# Patient Record
Sex: MALE | Race: OTHER | Employment: UNEMPLOYED | URBAN - METROPOLITAN AREA
[De-identification: names, ages, dates, MRNs, and addresses within clinical notes are randomized per-mention and may not be internally consistent; named-entity substitution may affect disease eponyms.]

---

## 2019-09-13 ENCOUNTER — NEW PATIENT (OUTPATIENT)
Dept: URBAN - METROPOLITAN AREA CLINIC 46 | Facility: CLINIC | Age: 54
End: 2019-09-13

## 2019-09-13 DIAGNOSIS — H40.1133: ICD-10-CM

## 2019-09-13 DIAGNOSIS — H25.813: ICD-10-CM

## 2019-09-13 DIAGNOSIS — H35.373: ICD-10-CM

## 2019-09-13 DIAGNOSIS — H04.123: ICD-10-CM

## 2019-09-13 PROCEDURE — 99204 OFFICE O/P NEW MOD 45 MIN: CPT

## 2019-09-13 PROCEDURE — 92020 GONIOSCOPY: CPT

## 2019-09-13 PROCEDURE — 76514 ECHO EXAM OF EYE THICKNESS: CPT

## 2019-09-13 PROCEDURE — 92250 FUNDUS PHOTOGRAPHY W/I&R: CPT

## 2019-09-13 PROCEDURE — 9222550 BILAT EXTENDED OPHTHALMOSCOPY, FIRST

## 2019-09-13 RX ORDER — NETARSUDIL AND LATANOPROST OPHTHALMIC SOLUTION, 0.02%/0.005% .2; .05 MG/ML; MG/ML
1 SOLUTION/ DROPS OPHTHALMIC; TOPICAL EVERY EVENING
Start: 2019-09-13

## 2019-09-13 ASSESSMENT — VISUAL ACUITY
OS_SC: 20/25-2
OD_SC: <J12
OD_SC: 20/25-2
OD_CC: J2
OS_SC: <J12
OS_CC: J3

## 2019-09-13 ASSESSMENT — PACHYMETRY
OD_CT_UM: 558
OS_CT_UM: 555

## 2019-09-13 ASSESSMENT — TONOMETRY
OD_IOP_MMHG: 24
OS_IOP_MMHG: 20

## 2019-10-04 ENCOUNTER — VISUAL FIELD FOLLOW-UP (OUTPATIENT)
Dept: URBAN - METROPOLITAN AREA CLINIC 46 | Facility: CLINIC | Age: 54
End: 2019-10-04

## 2019-10-04 DIAGNOSIS — H04.123: ICD-10-CM

## 2019-10-04 DIAGNOSIS — H40.1133: ICD-10-CM

## 2019-10-04 DIAGNOSIS — H25.813: ICD-10-CM

## 2019-10-04 DIAGNOSIS — H35.373: ICD-10-CM

## 2019-10-04 PROCEDURE — 92012 INTRM OPH EXAM EST PATIENT: CPT

## 2019-10-04 PROCEDURE — 92083 EXTENDED VISUAL FIELD XM: CPT

## 2019-10-04 RX ORDER — MOXIFLOXACIN HYDROCHLORIDE 5 MG/ML: 1 SOLUTION/ DROPS OPHTHALMIC

## 2019-10-04 RX ORDER — PREDNISOLONE ACETATE 10 MG/ML: 1 SUSPENSION/ DROPS OPHTHALMIC

## 2019-10-04 RX ORDER — CYCLOPENTOLATE HYDROCHLORIDE 10 MG/ML: 1 SOLUTION/ DROPS OPHTHALMIC TWICE A DAY

## 2019-10-04 ASSESSMENT — VISUAL ACUITY
OS_SC: 20/25
OD_SC: 20/30

## 2019-10-04 ASSESSMENT — TONOMETRY
OD_IOP_MMHG: 22
OS_IOP_MMHG: 20

## 2019-10-21 ENCOUNTER — PRE-OP/H&P (OUTPATIENT)
Dept: URBAN - METROPOLITAN AREA SURGERY 14 | Facility: SURGERY | Age: 54
End: 2019-10-21

## 2019-10-21 ENCOUNTER — SURGERY/PROCEDURE (OUTPATIENT)
Dept: URBAN - METROPOLITAN AREA CLINIC 46 | Facility: CLINIC | Age: 54
End: 2019-10-21

## 2019-10-21 DIAGNOSIS — H40.1113: ICD-10-CM

## 2019-10-21 DIAGNOSIS — H40.1133: ICD-10-CM

## 2019-10-21 PROCEDURE — 99211HP H&P OFFICE/OUTPATIENT VISIT, EST

## 2019-10-21 PROCEDURE — 66172 INCISION OF EYE: CPT

## 2019-10-22 ENCOUNTER — POST-OP (OUTPATIENT)
Dept: URBAN - METROPOLITAN AREA CLINIC 43 | Facility: CLINIC | Age: 54
End: 2019-10-22

## 2019-10-22 DIAGNOSIS — H40.1133: ICD-10-CM

## 2019-10-22 PROCEDURE — 66999PO NON CO-MANAGED OTHER SURGERY PO

## 2019-10-22 ASSESSMENT — VISUAL ACUITY
OD_PH: 20/70-2
OS_SC: 20/30-2
OD_SC: 20/200

## 2019-10-22 ASSESSMENT — TONOMETRY
OS_IOP_MMHG: 17
OD_IOP_MMHG: 09

## 2019-11-01 ENCOUNTER — POST-OP (OUTPATIENT)
Dept: URBAN - METROPOLITAN AREA CLINIC 36 | Facility: CLINIC | Age: 54
End: 2019-11-01

## 2019-11-01 DIAGNOSIS — H40.1133: ICD-10-CM

## 2019-11-01 PROCEDURE — 99024 POSTOP FOLLOW-UP VISIT: CPT

## 2019-11-01 ASSESSMENT — VISUAL ACUITY
OD_PH: 20/50-2
OD_SC: 20/400
OS_SC: 20/40-2

## 2019-11-01 ASSESSMENT — TONOMETRY
OD_IOP_MMHG: 16
OS_IOP_MMHG: 17

## 2019-11-08 ENCOUNTER — POST-OP (OUTPATIENT)
Dept: URBAN - METROPOLITAN AREA CLINIC 36 | Facility: CLINIC | Age: 54
End: 2019-11-08

## 2019-11-08 DIAGNOSIS — H40.1133: ICD-10-CM

## 2019-11-08 PROCEDURE — 99024 POSTOP FOLLOW-UP VISIT: CPT

## 2019-11-08 ASSESSMENT — TONOMETRY
OS_IOP_MMHG: 13
OD_IOP_MMHG: 10

## 2019-11-08 ASSESSMENT — VISUAL ACUITY
OS_SC: 20/50-1
OD_SC: 20/80-1
OD_PH: 20/40-1

## 2019-11-22 ENCOUNTER — POST-OP (OUTPATIENT)
Dept: URBAN - METROPOLITAN AREA CLINIC 36 | Facility: CLINIC | Age: 54
End: 2019-11-22

## 2019-11-22 DIAGNOSIS — H40.1133: ICD-10-CM

## 2019-11-22 PROCEDURE — 99024 POSTOP FOLLOW-UP VISIT: CPT

## 2019-11-22 ASSESSMENT — TONOMETRY
OD_IOP_MMHG: 15
OS_IOP_MMHG: 14

## 2019-11-22 ASSESSMENT — VISUAL ACUITY
OS_SC: 20/40-2
OD_PH: 20/40+1
OD_SC: 20/70-2

## 2019-12-06 ENCOUNTER — POST-OP (OUTPATIENT)
Dept: URBAN - METROPOLITAN AREA CLINIC 36 | Facility: CLINIC | Age: 54
End: 2019-12-06

## 2019-12-06 DIAGNOSIS — H40.1133: ICD-10-CM

## 2019-12-06 PROCEDURE — 66999PO NON CO-MANAGED OTHER SURGERY PO

## 2019-12-06 ASSESSMENT — VISUAL ACUITY: OD_PH: 20/40-2

## 2019-12-06 ASSESSMENT — TONOMETRY
OS_IOP_MMHG: 14
OD_IOP_MMHG: 16

## 2019-12-13 ENCOUNTER — POST-OP (OUTPATIENT)
Dept: URBAN - METROPOLITAN AREA CLINIC 36 | Facility: CLINIC | Age: 54
End: 2019-12-13

## 2019-12-13 DIAGNOSIS — H40.1133: ICD-10-CM

## 2019-12-13 PROCEDURE — 66250 FOLLOW-UP SURGERY OF EYE: CPT

## 2019-12-13 PROCEDURE — 99024 POSTOP FOLLOW-UP VISIT: CPT

## 2019-12-13 ASSESSMENT — TONOMETRY
OD_IOP_MMHG: 12
OD_IOP_MMHG: 16
OS_IOP_MMHG: 16

## 2019-12-13 ASSESSMENT — VISUAL ACUITY
OS_SC: 20/40-1
OD_PH: 20/40
OD_SC: 20/50-2

## 2019-12-20 ENCOUNTER — POST-OP (OUTPATIENT)
Dept: URBAN - METROPOLITAN AREA CLINIC 36 | Facility: CLINIC | Age: 54
End: 2019-12-20

## 2019-12-20 DIAGNOSIS — H40.1133: ICD-10-CM

## 2019-12-20 PROCEDURE — 99024 POSTOP FOLLOW-UP VISIT: CPT

## 2019-12-20 RX ORDER — DUREZOL 0.5 MG/ML: 1 EMULSION OPHTHALMIC

## 2019-12-20 ASSESSMENT — VISUAL ACUITY
OS_SC: 20/40
OD_SC: 20/70

## 2019-12-20 ASSESSMENT — TONOMETRY
OS_IOP_MMHG: 13
OD_IOP_MMHG: 16
OD_IOP_MMHG: 10

## 2020-01-10 ENCOUNTER — POST-OP (OUTPATIENT)
Dept: URBAN - METROPOLITAN AREA CLINIC 36 | Facility: CLINIC | Age: 55
End: 2020-01-10

## 2020-01-10 DIAGNOSIS — H40.1133: ICD-10-CM

## 2020-01-10 PROCEDURE — 99024 POSTOP FOLLOW-UP VISIT: CPT

## 2020-01-10 ASSESSMENT — TONOMETRY
OS_IOP_MMHG: 13
OD_IOP_MMHG: 05
OD_IOP_MMHG: 16

## 2020-01-10 ASSESSMENT — VISUAL ACUITY
OD_SC: 20/40-2
OS_SC: 20/25

## 2020-01-24 ENCOUNTER — IOP CHECK (OUTPATIENT)
Dept: URBAN - METROPOLITAN AREA CLINIC 36 | Facility: CLINIC | Age: 55
End: 2020-01-24

## 2020-01-24 DIAGNOSIS — H40.1133: ICD-10-CM

## 2020-01-24 PROCEDURE — 92012 INTRM OPH EXAM EST PATIENT: CPT

## 2020-01-24 ASSESSMENT — VISUAL ACUITY
OD_SC: 20/40-1
OS_SC: 20/25

## 2020-01-24 ASSESSMENT — TONOMETRY
OD_IOP_MMHG: 10
OS_IOP_MMHG: 11

## 2020-02-07 ENCOUNTER — IOP CHECK (OUTPATIENT)
Dept: URBAN - METROPOLITAN AREA CLINIC 36 | Facility: CLINIC | Age: 55
End: 2020-02-07

## 2020-02-07 DIAGNOSIS — H04.123: ICD-10-CM

## 2020-02-07 DIAGNOSIS — H25.813: ICD-10-CM

## 2020-02-07 DIAGNOSIS — H35.373: ICD-10-CM

## 2020-02-07 DIAGNOSIS — H40.1133: ICD-10-CM

## 2020-02-07 PROCEDURE — 92012 INTRM OPH EXAM EST PATIENT: CPT

## 2020-02-07 ASSESSMENT — TONOMETRY
OD_IOP_MMHG: 12
OS_IOP_MMHG: 17

## 2020-02-07 ASSESSMENT — VISUAL ACUITY
OD_SC: 20/40+2
OS_SC: 20/25-2

## 2020-03-20 ENCOUNTER — IOP CHECK (OUTPATIENT)
Dept: URBAN - METROPOLITAN AREA CLINIC 36 | Facility: CLINIC | Age: 55
End: 2020-03-20

## 2020-03-20 DIAGNOSIS — H40.1133: ICD-10-CM

## 2020-03-20 DIAGNOSIS — H25.813: ICD-10-CM

## 2020-03-20 DIAGNOSIS — H35.373: ICD-10-CM

## 2020-03-20 DIAGNOSIS — H04.123: ICD-10-CM

## 2020-03-20 PROCEDURE — 92012 INTRM OPH EXAM EST PATIENT: CPT

## 2020-03-20 ASSESSMENT — TONOMETRY
OD_IOP_MMHG: 13
OS_IOP_MMHG: 18

## 2020-03-20 ASSESSMENT — VISUAL ACUITY
OD_SC: 20/40
OS_SC: 20/40

## 2020-05-15 ENCOUNTER — IOP CHECK (OUTPATIENT)
Dept: URBAN - METROPOLITAN AREA CLINIC 36 | Facility: CLINIC | Age: 55
End: 2020-05-15

## 2020-05-15 DIAGNOSIS — H35.373: ICD-10-CM

## 2020-05-15 DIAGNOSIS — H25.813: ICD-10-CM

## 2020-05-15 DIAGNOSIS — H04.123: ICD-10-CM

## 2020-05-15 DIAGNOSIS — H40.1133: ICD-10-CM

## 2020-05-15 PROCEDURE — 92012 INTRM OPH EXAM EST PATIENT: CPT

## 2020-05-15 ASSESSMENT — TONOMETRY
OS_IOP_MMHG: 17
OD_IOP_MMHG: 13

## 2020-05-15 ASSESSMENT — VISUAL ACUITY
OS_SC: 20/40
OD_SC: 20/40

## 2020-06-12 ENCOUNTER — IOP CHECK (OUTPATIENT)
Dept: URBAN - METROPOLITAN AREA CLINIC 36 | Facility: CLINIC | Age: 55
End: 2020-06-12

## 2020-06-12 DIAGNOSIS — H40.1133: ICD-10-CM

## 2020-06-12 PROCEDURE — 92012 INTRM OPH EXAM EST PATIENT: CPT

## 2020-06-12 ASSESSMENT — VISUAL ACUITY
OD_SC: 20/40
OS_SC: 20/40

## 2020-06-12 ASSESSMENT — TONOMETRY
OD_IOP_MMHG: 10
OS_IOP_MMHG: 18

## 2020-07-17 ENCOUNTER — IOP CHECK (OUTPATIENT)
Dept: URBAN - METROPOLITAN AREA CLINIC 36 | Facility: CLINIC | Age: 55
End: 2020-07-17

## 2020-07-17 DIAGNOSIS — H40.1133: ICD-10-CM

## 2020-07-17 PROCEDURE — 92012 INTRM OPH EXAM EST PATIENT: CPT

## 2020-07-17 ASSESSMENT — TONOMETRY
OS_IOP_MMHG: 24
OD_IOP_MMHG: 10

## 2020-07-17 ASSESSMENT — VISUAL ACUITY
OS_SC: 20/50
OD_SC: 20/40-2

## 2020-08-14 ENCOUNTER — IOP CHECK (OUTPATIENT)
Dept: URBAN - METROPOLITAN AREA CLINIC 36 | Facility: CLINIC | Age: 55
End: 2020-08-14

## 2020-08-14 DIAGNOSIS — H40.1133: ICD-10-CM

## 2020-08-14 PROCEDURE — 92012 INTRM OPH EXAM EST PATIENT: CPT

## 2020-08-14 ASSESSMENT — TONOMETRY
OS_IOP_MMHG: 18
OD_IOP_MMHG: 08

## 2020-08-14 ASSESSMENT — VISUAL ACUITY
OD_SC: 20/40
OS_SC: 20/40

## 2020-09-11 ENCOUNTER — IOP CHECK (OUTPATIENT)
Dept: URBAN - METROPOLITAN AREA CLINIC 36 | Facility: CLINIC | Age: 55
End: 2020-09-11

## 2020-09-11 DIAGNOSIS — H40.1133: ICD-10-CM

## 2020-09-11 PROCEDURE — 92012 INTRM OPH EXAM EST PATIENT: CPT

## 2020-09-11 ASSESSMENT — VISUAL ACUITY
OS_SC: 20/40-2
OD_SC: 20/30-2

## 2020-09-11 ASSESSMENT — TONOMETRY
OS_IOP_MMHG: 14
OD_IOP_MMHG: 07

## 2021-02-12 ENCOUNTER — IOP CHECK (OUTPATIENT)
Dept: URBAN - METROPOLITAN AREA CLINIC 36 | Facility: CLINIC | Age: 56
End: 2021-02-12

## 2021-02-12 DIAGNOSIS — H40.1133: ICD-10-CM

## 2021-02-12 DIAGNOSIS — H35.373: ICD-10-CM

## 2021-02-12 DIAGNOSIS — H04.123: ICD-10-CM

## 2021-02-12 DIAGNOSIS — H25.813: ICD-10-CM

## 2021-02-12 PROCEDURE — 92012 INTRM OPH EXAM EST PATIENT: CPT

## 2021-02-12 ASSESSMENT — TONOMETRY
OD_IOP_MMHG: 12
OS_IOP_MMHG: 19

## 2021-02-12 ASSESSMENT — VISUAL ACUITY
OS_SC: 20/40
OD_SC: 20/40

## 2021-05-14 ENCOUNTER — DILATED FUNDUS EXAM (OUTPATIENT)
Dept: URBAN - METROPOLITAN AREA CLINIC 36 | Facility: CLINIC | Age: 56
End: 2021-05-14

## 2021-05-14 DIAGNOSIS — H40.1133: ICD-10-CM

## 2021-05-14 PROCEDURE — 92133 CPTRZD OPH DX IMG PST SGM ON: CPT

## 2021-05-14 PROCEDURE — 92012 INTRM OPH EXAM EST PATIENT: CPT

## 2021-05-14 PROCEDURE — 92202 OPSCPY EXTND ON/MAC DRAW: CPT

## 2021-05-14 ASSESSMENT — TONOMETRY
OS_IOP_MMHG: 16
OD_IOP_MMHG: 10

## 2021-05-14 ASSESSMENT — VISUAL ACUITY
OS_SC: 20/40+1
OD_SC: 20/40+1

## 2021-09-17 ENCOUNTER — ESTABLISHED COMPREHENSIVE EXAM (OUTPATIENT)
Dept: URBAN - METROPOLITAN AREA CLINIC 36 | Facility: CLINIC | Age: 56
End: 2021-09-17

## 2021-09-17 DIAGNOSIS — H40.1133: ICD-10-CM

## 2021-09-17 PROCEDURE — 92014 COMPRE OPH EXAM EST PT 1/>: CPT

## 2021-09-17 PROCEDURE — 92250 FUNDUS PHOTOGRAPHY W/I&R: CPT

## 2021-09-17 RX ORDER — BRIMONIDINE TARTRATE 1 MG/ML: 1 SOLUTION/ DROPS OPHTHALMIC

## 2021-09-17 ASSESSMENT — VISUAL ACUITY
OS_CC: J5
OD_SC: <J12
OD_SC: 20/50-2
OS_SC: 20/30
OD_CC: J5
OU_SC: 20/30-1
OS_SC: <J12
OU_SC: <J12
OU_CC: J5

## 2021-09-17 ASSESSMENT — TONOMETRY
OD_IOP_MMHG: 8
OS_IOP_MMHG: 8

## 2021-12-17 ENCOUNTER — ESTABLISHED PATIENT (OUTPATIENT)
Dept: URBAN - METROPOLITAN AREA CLINIC 36 | Facility: CLINIC | Age: 56
End: 2021-12-17

## 2021-12-17 DIAGNOSIS — H40.1133: ICD-10-CM

## 2021-12-17 PROCEDURE — 92083 EXTENDED VISUAL FIELD XM: CPT

## 2021-12-17 PROCEDURE — 92012 INTRM OPH EXAM EST PATIENT: CPT

## 2021-12-17 ASSESSMENT — TONOMETRY
OS_IOP_MMHG: 16
OD_IOP_MMHG: 09

## 2021-12-17 ASSESSMENT — VISUAL ACUITY
OS_SC: 20/40
OD_SC: 20/60-2

## 2022-01-05 NOTE — PATIENT DISCUSSION
Pt. elects Basic [facility]-OU.  Pt. understands and accepts the need for full time correction after surgery d/t astigmatism.  Pt. did not want to discuss any other lens option other than basic.

## 2022-02-02 NOTE — PATIENT DISCUSSION
Discussed the importance of regular follow ups because glaucoma can lead to irreversible vision loss.

## 2023-01-27 ENCOUNTER — COMPREHENSIVE EXAM (OUTPATIENT)
Dept: URBAN - METROPOLITAN AREA CLINIC 36 | Facility: CLINIC | Age: 58
End: 2023-01-27

## 2023-01-27 DIAGNOSIS — H40.1133: ICD-10-CM

## 2023-01-27 PROCEDURE — 92014 COMPRE OPH EXAM EST PT 1/>: CPT

## 2023-01-27 PROCEDURE — 92250 FUNDUS PHOTOGRAPHY W/I&R: CPT

## 2023-01-27 ASSESSMENT — VISUAL ACUITY
OD_PH: 20/50
OD_RAM: 20/30
OS_SC: 20/50
OD_BAT: 20/200
OD_SC: 20/70
OS_SC: J12
OS_BAT: 20/80
OS_AM: 20/30
OD_SC: J12

## 2023-01-27 ASSESSMENT — TONOMETRY
OS_IOP_MMHG: 11
OD_IOP_MMHG: 07

## 2023-05-19 ENCOUNTER — CONSULTATION/EVALUATION (OUTPATIENT)
Dept: URBAN - METROPOLITAN AREA CLINIC 36 | Facility: CLINIC | Age: 58
End: 2023-05-19

## 2023-05-19 DIAGNOSIS — H40.1133: ICD-10-CM

## 2023-05-19 DIAGNOSIS — H18.513: ICD-10-CM

## 2023-05-19 DIAGNOSIS — H04.123: ICD-10-CM

## 2023-05-19 DIAGNOSIS — H25.813: ICD-10-CM

## 2023-05-19 PROCEDURE — 92286 ANT SGM IMG I&R SPECLR MIC: CPT

## 2023-05-19 PROCEDURE — 92025-1 CORNEAL TOPOGRAPHY, INS

## 2023-05-19 PROCEDURE — 92136TC INTERFEROMETRY - TECHNICAL COMPONENT

## 2023-05-19 PROCEDURE — 92133 CPTRZD OPH DX IMG PST SGM ON: CPT

## 2023-05-19 PROCEDURE — V2799PMN IMPRIMIS PRED-MOXI-NEPAF 5ML

## 2023-05-19 PROCEDURE — 92004 COMPRE OPH EXAM NEW PT 1/>: CPT

## 2023-05-19 ASSESSMENT — VISUAL ACUITY
OD_CC: J6
OS_AM: 20/30
OS_BAT: 20/80
OD_BAT: 20/200
OD_RAM: 20/30
OD_SC: 20/60-2
OS_SC: 20/40-2

## 2023-05-19 ASSESSMENT — TONOMETRY
OS_IOP_MMHG: 10
OD_IOP_MMHG: 09

## 2023-06-06 ENCOUNTER — SURGERY/PROCEDURE (OUTPATIENT)
Dept: URBAN - METROPOLITAN AREA CLINIC 36 | Facility: CLINIC | Age: 58
End: 2023-06-06

## 2023-06-06 DIAGNOSIS — H40.1123: ICD-10-CM

## 2023-06-06 DIAGNOSIS — H25.813: ICD-10-CM

## 2023-06-06 PROCEDURE — 66984 XCAPSL CTRC RMVL W/O ECP: CPT

## 2023-06-06 PROCEDURE — 65820 GONIOTOMY: CPT

## 2023-06-07 ENCOUNTER — POST-OP (OUTPATIENT)
Dept: URBAN - METROPOLITAN AREA CLINIC 36 | Facility: CLINIC | Age: 58
End: 2023-06-07

## 2023-06-07 DIAGNOSIS — Z96.1: ICD-10-CM

## 2023-06-07 PROCEDURE — 99024 POSTOP FOLLOW-UP VISIT: CPT

## 2023-06-07 ASSESSMENT — VISUAL ACUITY
OS_SC: 20/50+1
OD_SC: 20/80-1

## 2023-06-07 ASSESSMENT — TONOMETRY
OS_IOP_MMHG: 13
OD_IOP_MMHG: 12

## 2023-06-13 ENCOUNTER — POST OP/EVAL OF SECOND EYE (OUTPATIENT)
Dept: URBAN - METROPOLITAN AREA CLINIC 36 | Facility: CLINIC | Age: 58
End: 2023-06-13

## 2023-06-13 DIAGNOSIS — H25.811: ICD-10-CM

## 2023-06-13 DIAGNOSIS — H43.821: ICD-10-CM

## 2023-06-13 DIAGNOSIS — H35.353: ICD-10-CM

## 2023-06-13 DIAGNOSIS — H40.1133: ICD-10-CM

## 2023-06-13 DIAGNOSIS — H35.371: ICD-10-CM

## 2023-06-13 DIAGNOSIS — Z96.1: ICD-10-CM

## 2023-06-13 DIAGNOSIS — H18.513: ICD-10-CM

## 2023-06-13 DIAGNOSIS — H04.123: ICD-10-CM

## 2023-06-13 PROCEDURE — 92012 INTRM OPH EXAM EST PATIENT: CPT

## 2023-06-13 ASSESSMENT — VISUAL ACUITY
OD_SC: 20/50-2
OS_SC: J8
OS_SC: 20/50+1
OD_SC: J6

## 2023-06-13 ASSESSMENT — TONOMETRY
OS_IOP_MMHG: 08
OD_IOP_MMHG: 07

## 2023-06-20 ENCOUNTER — PRE-OP/H&P (OUTPATIENT)
Dept: URBAN - METROPOLITAN AREA CLINIC 39 | Facility: CLINIC | Age: 58
End: 2023-06-20

## 2023-06-20 ENCOUNTER — SURGERY/PROCEDURE (OUTPATIENT)
Dept: URBAN - METROPOLITAN AREA CLINIC 36 | Facility: CLINIC | Age: 58
End: 2023-06-20

## 2023-06-20 DIAGNOSIS — H25.811: ICD-10-CM

## 2023-06-20 DIAGNOSIS — H35.353: ICD-10-CM

## 2023-06-20 DIAGNOSIS — H40.1133: ICD-10-CM

## 2023-06-20 DIAGNOSIS — H53.141: ICD-10-CM

## 2023-06-20 DIAGNOSIS — H35.371: ICD-10-CM

## 2023-06-20 DIAGNOSIS — Z96.1: ICD-10-CM

## 2023-06-20 DIAGNOSIS — H43.821: ICD-10-CM

## 2023-06-20 PROCEDURE — 65820 GONIOTOMY: CPT

## 2023-06-20 PROCEDURE — 66984 XCAPSL CTRC RMVL W/O ECP: CPT

## 2023-06-21 ENCOUNTER — POST-OP (OUTPATIENT)
Dept: URBAN - METROPOLITAN AREA CLINIC 46 | Facility: CLINIC | Age: 58
End: 2023-06-21

## 2023-06-21 DIAGNOSIS — Z96.1: ICD-10-CM

## 2023-06-21 ASSESSMENT — TONOMETRY
OD_IOP_MMHG: 10
OS_IOP_MMHG: 08

## 2023-06-21 ASSESSMENT — VISUAL ACUITY
OS_SC: 20/40
OD_SC: 20/60
OS_PH: 20/30
OD_PH: 20/50

## 2023-07-12 ENCOUNTER — POST-OP (OUTPATIENT)
Dept: URBAN - METROPOLITAN AREA CLINIC 36 | Facility: CLINIC | Age: 58
End: 2023-07-12

## 2023-07-12 DIAGNOSIS — Z96.1: ICD-10-CM

## 2023-07-12 PROCEDURE — 99024 POSTOP FOLLOW-UP VISIT: CPT

## 2023-07-12 ASSESSMENT — TONOMETRY
OD_IOP_MMHG: 08
OS_IOP_MMHG: 08
OS_IOP_MMHG: 12
OD_IOP_MMHG: 12

## 2023-07-12 ASSESSMENT — VISUAL ACUITY
OS_SC: J>10
OD_SC: 20/50-1
OS_SC: 20/50-2
OD_SC: J>10

## 2023-08-23 ENCOUNTER — COMPREHENSIVE EXAM (OUTPATIENT)
Dept: URBAN - METROPOLITAN AREA CLINIC 36 | Facility: CLINIC | Age: 58
End: 2023-08-23

## 2023-08-23 DIAGNOSIS — H04.123: ICD-10-CM

## 2023-08-23 DIAGNOSIS — H43.821: ICD-10-CM

## 2023-08-23 DIAGNOSIS — H40.1133: ICD-10-CM

## 2023-08-23 DIAGNOSIS — H35.371: ICD-10-CM

## 2023-08-23 DIAGNOSIS — H35.353: ICD-10-CM

## 2023-08-23 DIAGNOSIS — Z96.1: ICD-10-CM

## 2023-08-23 PROCEDURE — 67515 INJECT/TREAT EYE SOCKET: CPT

## 2023-08-23 PROCEDURE — 92235 FLUORESCEIN ANGRPH MLTIFRAME: CPT

## 2023-08-23 PROCEDURE — 99214 OFFICE O/P EST MOD 30 MIN: CPT

## 2023-08-23 PROCEDURE — 92250 FUNDUS PHOTOGRAPHY W/I&R: CPT

## 2023-08-23 RX ORDER — KETOROLAC TROMETHAMINE 5 MG/ML
1 SOLUTION OPHTHALMIC
Start: 2023-08-23

## 2023-08-23 RX ORDER — DIFLUPREDNATE 0.5 MG/ML
1 EMULSION OPHTHALMIC
Start: 2023-08-23

## 2023-08-23 ASSESSMENT — VISUAL ACUITY
OS_SC: 20/40-1
OD_SC: 20/40-1+2

## 2023-08-23 ASSESSMENT — TONOMETRY
OD_IOP_MMHG: 05
OS_IOP_MMHG: 06

## 2023-10-04 ENCOUNTER — ESTABLISHED PATIENT (OUTPATIENT)
Dept: URBAN - METROPOLITAN AREA CLINIC 36 | Facility: CLINIC | Age: 58
End: 2023-10-04

## 2023-10-04 DIAGNOSIS — H40.1133: ICD-10-CM

## 2023-10-04 DIAGNOSIS — H43.821: ICD-10-CM

## 2023-10-04 DIAGNOSIS — H04.123: ICD-10-CM

## 2023-10-04 DIAGNOSIS — H35.353: ICD-10-CM

## 2023-10-04 DIAGNOSIS — H35.371: ICD-10-CM

## 2023-10-04 PROCEDURE — 99213 OFFICE O/P EST LOW 20 MIN: CPT

## 2023-10-04 PROCEDURE — 92250 FUNDUS PHOTOGRAPHY W/I&R: CPT

## 2023-10-04 ASSESSMENT — VISUAL ACUITY
OD_SC: 20/40-1
OS_SC: 20/50-1

## 2023-10-04 ASSESSMENT — TONOMETRY
OS_IOP_MMHG: 11
OD_IOP_MMHG: 10

## 2023-12-07 ENCOUNTER — FOLLOW UP (OUTPATIENT)
Dept: URBAN - METROPOLITAN AREA CLINIC 46 | Facility: CLINIC | Age: 58
End: 2023-12-07

## 2023-12-07 DIAGNOSIS — H40.1133: ICD-10-CM

## 2023-12-07 DIAGNOSIS — H35.371: ICD-10-CM

## 2023-12-07 DIAGNOSIS — H35.353: ICD-10-CM

## 2023-12-07 DIAGNOSIS — H26.493: ICD-10-CM

## 2023-12-07 PROCEDURE — 92012 INTRM OPH EXAM EST PATIENT: CPT

## 2023-12-07 ASSESSMENT — VISUAL ACUITY
OS_BAT: 20/200
OD_SC: 20/50-2
OS_SC: 20/40-1
OD_BAT: 20/100

## 2023-12-07 ASSESSMENT — TONOMETRY
OD_IOP_MMHG: 10
OS_IOP_MMHG: 10

## 2023-12-21 ENCOUNTER — CONSULTATION/EVALUATION (OUTPATIENT)
Dept: URBAN - METROPOLITAN AREA CLINIC 43 | Facility: CLINIC | Age: 58
End: 2023-12-21

## 2023-12-21 DIAGNOSIS — H26.493: ICD-10-CM

## 2023-12-21 DIAGNOSIS — H40.1133: ICD-10-CM

## 2023-12-21 DIAGNOSIS — H35.371: ICD-10-CM

## 2023-12-21 PROCEDURE — 92004 COMPRE OPH EXAM NEW PT 1/>: CPT

## 2023-12-21 RX ORDER — PREDNISOLONE ACETATE 10 MG/ML
1 SUSPENSION/ DROPS OPHTHALMIC ONCE A DAY
Start: 2023-12-21

## 2023-12-21 ASSESSMENT — VISUAL ACUITY
OD_SC: 20/40
OS_SC: 20/40-1
OS_SC: <J12
OD_BAT: 20/100
OS_BAT: 20/200
OD_SC: <J12

## 2023-12-21 ASSESSMENT — TONOMETRY
OS_IOP_MMHG: 10
OD_IOP_MMHG: 14

## 2024-01-16 ENCOUNTER — PRE-OP/H&P (OUTPATIENT)
Dept: URBAN - METROPOLITAN AREA SURGERY 14 | Facility: SURGERY | Age: 59
End: 2024-01-16

## 2024-01-16 ENCOUNTER — SURGERY/PROCEDURE (OUTPATIENT)
Dept: URBAN - METROPOLITAN AREA SURGERY 14 | Facility: SURGERY | Age: 59
End: 2024-01-16

## 2024-01-16 DIAGNOSIS — H26.493: ICD-10-CM

## 2024-01-16 PROCEDURE — 6682150 YAG CAPSULOTOMY

## 2024-01-16 PROCEDURE — 99211HP PRE-OP

## 2024-04-10 ENCOUNTER — POST-OP (OUTPATIENT)
Dept: URBAN - METROPOLITAN AREA CLINIC 46 | Facility: CLINIC | Age: 59
End: 2024-04-10

## 2024-04-10 DIAGNOSIS — Z98.890: ICD-10-CM

## 2024-04-10 PROCEDURE — 99024 POSTOP FOLLOW-UP VISIT: CPT

## 2024-04-10 ASSESSMENT — VISUAL ACUITY
OD_CC: J5
OS_SC: 20/50-1
OD_SC: 20/30-2
OS_CC: J8

## 2024-04-10 ASSESSMENT — TONOMETRY
OD_IOP_MMHG: 13
OS_IOP_MMHG: 09

## 2024-07-19 ENCOUNTER — FOLLOW UP (OUTPATIENT)
Dept: URBAN - METROPOLITAN AREA CLINIC 46 | Facility: CLINIC | Age: 59
End: 2024-07-19

## 2024-07-19 DIAGNOSIS — H16.223: ICD-10-CM

## 2024-07-19 DIAGNOSIS — H40.1133: ICD-10-CM

## 2024-07-19 PROCEDURE — 92012 INTRM OPH EXAM EST PATIENT: CPT

## 2024-07-19 ASSESSMENT — VISUAL ACUITY
OS_SC: 20/50+2
OD_SC: 20/30-1

## 2024-07-19 ASSESSMENT — TONOMETRY
OD_IOP_MMHG: 11
OS_IOP_MMHG: 10

## 2024-08-12 ENCOUNTER — FOLLOW UP (OUTPATIENT)
Dept: URBAN - METROPOLITAN AREA CLINIC 46 | Facility: CLINIC | Age: 59
End: 2024-08-12

## 2024-08-12 DIAGNOSIS — H40.1133: ICD-10-CM

## 2024-08-12 DIAGNOSIS — H16.223: ICD-10-CM

## 2024-08-12 PROCEDURE — 92083 EXTENDED VISUAL FIELD XM: CPT

## 2024-08-12 PROCEDURE — 92012 INTRM OPH EXAM EST PATIENT: CPT

## 2024-08-12 ASSESSMENT — TONOMETRY
OS_IOP_MMHG: 09
OD_IOP_MMHG: 10

## 2024-08-12 ASSESSMENT — VISUAL ACUITY
OS_SC: 20/30
OD_SC: 20/30

## 2025-01-08 ENCOUNTER — COMPREHENSIVE EXAM (OUTPATIENT)
Age: 60
End: 2025-01-08

## 2025-01-08 DIAGNOSIS — H35.371: ICD-10-CM

## 2025-01-08 DIAGNOSIS — H52.7: ICD-10-CM

## 2025-01-08 DIAGNOSIS — H35.353: ICD-10-CM

## 2025-01-08 DIAGNOSIS — H43.821: ICD-10-CM

## 2025-01-08 DIAGNOSIS — H40.1133: ICD-10-CM

## 2025-01-08 DIAGNOSIS — H16.223: ICD-10-CM

## 2025-01-08 PROCEDURE — 92134 CPTRZ OPH DX IMG PST SGM RTA: CPT

## 2025-01-08 PROCEDURE — 92014 COMPRE OPH EXAM EST PT 1/>: CPT

## 2025-01-08 PROCEDURE — 92015 DETERMINE REFRACTIVE STATE: CPT

## 2025-04-10 ENCOUNTER — PREPPED CHART (OUTPATIENT)
Age: 60
End: 2025-04-10

## 2025-06-26 ENCOUNTER — FOLLOW UP (OUTPATIENT)
Age: 60
End: 2025-06-26

## 2025-06-26 DIAGNOSIS — H35.371: ICD-10-CM

## 2025-06-26 DIAGNOSIS — H40.1133: ICD-10-CM

## 2025-06-26 PROCEDURE — 92012 INTRM OPH EXAM EST PATIENT: CPT

## 2025-06-26 PROCEDURE — 92133 CPTRZD OPH DX IMG PST SGM ON: CPT
